# Patient Record
(demographics unavailable — no encounter records)

---

## 2024-11-12 NOTE — HISTORY OF PRESENT ILLNESS
[FreeTextEntry1] : The patient is 22 years old  0 whose last menstrual period was 2024.  She is referred for evaluation of chronic vaginitis.  4 years ago, the patient had unprotected intercourse.  She developed a clumpy vaginal discharge with itching and an odor (not fishy).  All testing was negative.  She was treated with fluconazole with slight relief but the symptoms recurred eventually, she tested positive for Ureaplasma.  She was treated with doxycycline.  Her symptoms improved but recurred a few days later.  She was then treated with azithromycin and her symptoms improved but returned a few days later.  She has tested positive for Ureaplasma on at least 4 different occasions (most recently 6 months ago).  She has not been sexually active for 1 year.  Following her positive test for Ureaplasma 6 months ago, she was treated with doxycycline and a follow-up test was negative.  Today, she is complaining of a white clumpy vaginal discharge with an onion odor, slight irritation and itching. ***The patient was asked about all of the following, positive responses are listed below*** Gynecologic History: History of breast biopsy or breast cyst aspiration; Pain during or after intercourse; Vaginal bleeding or spotting between periods; Breast discharge; Abnormal or irregular periods; Abnormal vaginal discharge; history of Gynecologic cancer; history of Ovarian cysts; history of Fibroids; history of frequent Urinary tract infections; Urinary problems (i.e. frequency, urgency, difficulty, leaking); history of Pelvic pain/pelvic inflammatory disease; history of chronic Vaginal infections (i.e., yeast, Trichomonas, bacterial Vaginosis) Contraceptive History: What she is currently using for birth control; If she was requesting birth control today. Sexually Transmitted Disease History: A history of any of the following sexually transmitted diseases: Gonorrhea; Chlamydia; Syphilis; Herpes; HPV/Warts; Hepatitis; HIV/AIDS and whether she feels that she is at risk for HIV/AIDS and wants to be tested for HIV. Abnormal Pap Smear History: History of abnormal Pap smear; evaluation of any abnormalities; treatment of any abnormalities Date and result of her last Pap smear. PEDRO in Utero Exposure History: A history of personal or maternal PEDRO exposure. Hospitalizations (other than childbirth) Blood transfusions. Review of Systems: General: weight change, general health; Head: headaches, vertigo; Eyes: vision, diplopia; Ears: change in hearing, tinnitus; Nose: epistaxis, chronic rhinitis; Mouth: gingival bleeding; Neck: stiffness, pain, masses; Chest: dyspnea, wheezing, hemoptysis, persistent cough; Heart: chest pains, palpitations; Abdomen: liver disease, abdominal discomfort, Indigestion, Nausea/Vomiting, Constipation/Diarrhea, Blood in stool, change in bowel habits; : renal disease; Musculoskeletal: pain in muscles or joints, paresthesias or numbness; Skin: rashes, itching, pigmentation changes; Neurologic: weakness, tremor, seizures, changes in mentation; Psychiatric: depressive symptoms, changes in sleep habits. Surgery: History of gynecological surgery; History of non-gynecological surgery.   ***Pregnancy History*** # Pregnancies 0.   ***Menstrual History*** Age of first period:10; # of days between cycles:30; duration of period: 7 days; she has cramps with periods; uses a heating pad to relieve cramps.   ***Sexual history*** She is not currently sexually active; [place]: 17; Total # of lifetime partners:7; She denies having more than one current partner; Date of last sexual contact (if not currently sexually active): 2023.   ***Medical history*** Alcohol abuse; Anemia; Anxiety; Arthritis/Lupus; Asthma; Blood clots in legs; Blood disorders; Breast problems; Cancer; Colitis; Depression; Diabetes; Drug Abuse; Eating disorder; Elevated cholesterol; Epilepsy/seizures; Eye problems/glaucoma; Gall bladder disease; Gout; Head or neck radiation; Hearing problems; Heart Disease; Hemorrhoid; Hepatitis or jaundice; High blood pressure; HIV/AIDS; Kidney disease; Low back problems; Neurological disorders; Osteoporosis; Other; Pneumonia; Rheumatic fever; Skin diseases; Stroke; Thyroid disease; Ulcers Family History - Cancers of the Breast; Cervix; Uterus; Ovarian; Lung; Colon; Other Vaccination Status - Whether she had all the usual childhood vaccinations or illness (negative answers recorded); Whether she was not vaccinated for HPV (All 3 doses) Social History - Current or past cigarette smoking; current or past alcohol abuse; current or past Marijuana use; current or past Cocaine use; current or past abuse of any other illegal or prescription drugs; current employment.   ***Preventative Care*** Date of the patient's last: Pap smear: 24   ***Social History*** The patient was asked about all of the following; positive responses are listed below: current or past cigarette smoking; Alcohol; current or past Marijuana use; current or past Cocaine use; current or past abuse of any other illegal or prescription drugs. Employment: .   ***Significant positives*** She was not taking any medication at the time of the visit. She denied any medication allergies at the time of the visit. Her past gynecological history is significant for vaginal infections/ureaplasma and abnormal Pap test in . Her past gynecological history is not otherwise significant. She is not sexually active and uses nothing for birth control. She has a history of abnormal Pap tests. Her last Pap test was 2024 and was normal. Her past medical history is significant for asthma. Her past surgical history is not significant. Her past medical and surgical histories are not otherwise significant. She has not been hospitalized. She was vaccinated for HPV (all 3 doses prior to coitarche). A 20-point review of systems was not significant. Her family's cancer history is not significant. She does not smoke. She uses marijuana. She occasionally consumes alcohol.

## 2024-11-12 NOTE — PLAN
[FreeTextEntry1] : I recommended that she call for the results of her Ureaplasma and Candida culture in 1 week.  Assuming a negative yeast culture, I recommended that she return when her symptoms are more prominent.

## 2024-11-12 NOTE — ASSESSMENT
[TextEntry] : For the past 4 years, the patient has had an intermittent vaginal discharge.  It started as an itchy, clumpy discharge with a known fishy odor.  Initially, she tested negative for "everything" but eventually she tested positive for Ureaplasma.  She was treated with doxycycline and later azithromycin.  She tested positive on at least 4 occasions.  Treatment gave her slight temporary from her symptoms.  She has not been sexually active for 1 year.  6 months ago, she tested positive for Ureaplasma and was treated with doxycycline.  Subsequent tests were negative.  Today, she complains of a clumpy white discharge with an onion odor and slight irritation and itching.  Ureaplasma testing was done.  There is no evidence of BV (by Amsel's criteria or Candida (pending culture).  Of note, the patient occasionally takes probiotics.  Regardless of the result of Ureaplasma testing, it tends not to cause the symptoms of which she is complaining. 50 minutes of total time was spent on the date of the encounter. This included time for review of medical records and laboratory results, face to face time (including the physical examination counseling and coordination of care), time for patient education, treatment plans, answering questions, communicating with other doctors and for medical record documentation.  The time spent is separate from time spent on separately billed procedures.

## 2024-11-12 NOTE — PHYSICAL EXAM
[Chaperone Present] : A chaperone was present in the examining room during all aspects of the physical examination [TextEntry] : ***General*** General Appearance: normal; Judgment and insight: normal; the patient is oriented to time, place and person; Recent and remote memory: normal; Mood and affect: normal; Respiratory effort: normal.   ***Pelvic Examination*** External genitalia: the appearance and hair distribution are normal; no lesions are appreciated. Urethra/urethral meatus: no masses or tenderness are appreciated; there is no scarring noted. Bladder: nontender; there is no fullness appreciated; no masses were palpated. Vagina: the vagina is normally rugated; a white discharge is seen; no lesions are seen; pelvic support is adequate without any evidence of cystocele or rectocele. Cervix: normal in appearance; no overt lesions are seen. Uterus: Anteverted; normal in size, mobile, nontender, and well supported. Adnexa/parametria: nontender and not enlarged; no masses are palpated. A stool specimen was not indicated at this time.   ***Vaginal Discharge Evaluation*** A saline wet mount and KOH slide evaluation of the vaginal discharge were done. The discharge was white; the pH was normal; the whiff test was negative; clue cells were not seen; hyphae were not seen; a large number of lactobacilli were seen; a few white blood cells were seen; superficial cells were seen; a candida culture was sent.   ***colposcopy of the vulva*** Colposcopy of the external genitalia showed that the labia majora and labia minora were normal. She identified the introitus as the location of her pain/itching/irritation. There was no vestibular tenderness of the anterior minor vestibular glands, and no vestibular tenderness of the posterior minor vestibular glands. There was no evidence of other dermatopathology. There was mild erythema of the introitus.

## 2024-12-17 NOTE — PHYSICAL EXAM
[Chaperone Present] : A chaperone was present in the examining room during all aspects of the physical examination [TextEntry] : .***General*** General Appearance: normal; Judgment and insight: normal; the patient is oriented to time, place and person; Recent and remote memory: normal; Mood and affect: normal; Respiratory effort: normal.  ***Pelvic Examination*** External genitalia: the appearance and hair distribution are normal; no lesions are appreciated. Urethra/urethral meatus: no masses or tenderness are appreciated; there is no scarring noted. Bladder: nontender; there is no fullness appreciated; no masses were palpated. Vagina: the vagina is normally rugated; a white discharge is seen; no lesions are seen; pelvic support is adequate without any evidence of cystocele or rectocele. Cervix: normal in appearance; no overt lesions are seen. Uterus: Anteverted; normal in size, mobile, nontender, and well supported. Adnexa/parametria: nontender and not enlarged; no masses are palpated. A stool specimen was not indicated at this time.  ***Vaginal Discharge Evaluation*** A saline wet mount and KOH slide evaluation of the vaginal discharge were done. The discharge was white; the pH was normal; the whiff test was negative; clue cells were not seen; hyphae were not seen; a large number of lactobacilli were seen; a few white blood cells were seen; superficial cells were seen; a candida culture was sent.  ***colposcopy of the vulva*** Colposcopy of the external genitalia showed that the labia majora and labia minora were normal. She identified the introitus as the location of her pain/itching/irritation. There was no vestibular tenderness of the anterior minor vestibular glands, and no vestibular tenderness of the posterior minor vestibular glands. There was no evidence of other dermatopathology. There was mild erythema of the introitus.

## 2024-12-17 NOTE — HISTORY OF PRESENT ILLNESS
[FreeTextEntry1] : For the past 4 years, the patient has had an intermittent vaginal discharge. It started as an itchy, clumpy discharge with a fishy odor. Initially, she tested negative for "everything" but eventually she tested positive for Ureaplasma. She was treated with doxycycline and later azithromycin. Subsequent tests were negative.  1 month ago, there is no evidence of BV (by Amsel's criteria or Candida on culture.  Today, she continues to complain of a clumpy vaginal discharge with a funny (not fishy) odor.  She is also complaining of some vulvar irritation.

## 2024-12-17 NOTE — PLAN
[FreeTextEntry1] : I reassured the patient that she does not have any vaginal infection or condition.  I encouraged her to go on with her life.  She should call for the results of her yeast culture in 1 week.

## 2024-12-17 NOTE — ASSESSMENT
[TextEntry] : For the past 4 years, the patient has had an intermittent vaginal discharge with a "funny" (not fishy) odor and occasional irritation (or vulvar awareness).  1 month ago, there was no BV (by Amsel's criteria or Candida on culture.  Today she has similar symptoms and the findings are identical.  A large number of lactobacilli were seen.  The patient is obsessing over these findings and she does not appear able to "let it go". 23 minutes of total time was spent on the date of the encounter. This included time for review of medical records and laboratory results, face to face time (including the physical examination counseling and coordination of care), time for patient education, treatment plans, answering questions, communicating with other doctors and for medical record documentation. The time spent is separate from time spent on separately billed procedures.